# Patient Record
Sex: FEMALE | Race: WHITE
[De-identification: names, ages, dates, MRNs, and addresses within clinical notes are randomized per-mention and may not be internally consistent; named-entity substitution may affect disease eponyms.]

---

## 2018-10-17 ENCOUNTER — HOSPITAL ENCOUNTER (EMERGENCY)
Dept: HOSPITAL 76 - ED | Age: 58
LOS: 1 days | Discharge: HOME | End: 2018-10-18
Payer: COMMERCIAL

## 2018-10-17 DIAGNOSIS — R11.2: ICD-10-CM

## 2018-10-17 DIAGNOSIS — T62.0X1A: Primary | ICD-10-CM

## 2018-10-17 LAB
ALBUMIN DIAFP-MCNC: 4.1 G/DL (ref 3.2–5.5)
ALBUMIN/GLOB SERPL: 1.5 {RATIO} (ref 1–2.2)
ALP SERPL-CCNC: 71 IU/L (ref 42–121)
ALT SERPL W P-5'-P-CCNC: 20 IU/L (ref 10–60)
ANION GAP SERPL CALCULATED.4IONS-SCNC: 8 MMOL/L (ref 6–13)
AST SERPL W P-5'-P-CCNC: 22 IU/L (ref 10–42)
BILIRUB BLD-MCNC: 1.1 MG/DL (ref 0.2–1)
BUN SERPL-MCNC: 14 MG/DL (ref 6–20)
CALCIUM UR-MCNC: 9 MG/DL (ref 8.5–10.3)
CHLORIDE SERPL-SCNC: 102 MMOL/L (ref 101–111)
CO2 SERPL-SCNC: 27 MMOL/L (ref 21–32)
CREAT SERPLBLD-SCNC: 0.8 MG/DL (ref 0.4–1)
GFRSERPLBLD MDRD-ARVRAT: 74 ML/MIN/{1.73_M2} (ref 89–?)
GLOBULIN SER-MCNC: 2.8 G/DL (ref 2.1–4.2)
GLUCOSE SERPL-MCNC: 110 MG/DL (ref 70–100)
LIPASE SERPL-CCNC: 29 U/L (ref 22–51)
PROT SPEC-MCNC: 6.9 G/DL (ref 6.7–8.2)
SODIUM SERPLBLD-SCNC: 137 MMOL/L (ref 135–145)

## 2018-10-17 PROCEDURE — 36415 COLL VENOUS BLD VENIPUNCTURE: CPT

## 2018-10-17 PROCEDURE — 83690 ASSAY OF LIPASE: CPT

## 2018-10-17 PROCEDURE — 99283 EMERGENCY DEPT VISIT LOW MDM: CPT

## 2018-10-17 PROCEDURE — 80053 COMPREHEN METABOLIC PANEL: CPT

## 2018-10-17 NOTE — ED PHYSICIAN DOCUMENTATION
PD HPI NVD





- Stated complaint


Stated Complaint: N/V/D





- Chief complaint


Chief Complaint: Abd Pain





- History obtained from


History obtained from: Patient





- History of Present Illness


Timing - onset: How many hours ago (2 1/2), Today


Timing - duration: Hours (1-2)


Timing - details: Abrupt onset, Still present (much less, but still with some 

nausea.)


Associated symptoms: Abdominal pain (Mild cramping upper abdominal pain 

associated with the nausea and repetitive vomiting.).  No: Fever


Contributing factors: Bad food (She and her  both ate cassidy mushrooms 

that they had picked themselves in [place] and dehydrated.  They have had some 

of these in the past sauted.  The  had rehydrated them and CHOP them up 

and put them in a sauce without cooking.  They both felt abruptly nauseated with

repetitive vomiting about 20 minutes after eating the sauce.  They also had some

blue cheese dressing on the solid and some well cooked chicken.  Given both of 

them sick abruptly together, they presumed it was food related and were 

concerned about the mushrooms.).  No: Sick contact


Improved by: No: Vomiting


Worsened by: Eating


Similar symptoms before: Has not had sx before


Recently seen: Not recently seen





Review of Systems


Constitutional: denies: Fever, Myalgias


Nose: denies: Rhinorrhea / runny nose, Congestion


Throat: denies: Sore throat


Cardiac: denies: Chest pain / pressure


Respiratory: denies: Dyspnea, Cough


GI: reports: Abdominal Pain, Nausea, Vomiting.  denies: Diarrhea, Hematemesis, 

Bloody / black stool


Skin: denies: Rash, Lesions


Neurologic: denies: Altered mental status, Headache





PD PAST MEDICAL HISTORY





- Past Medical History


Past Medical History: Yes


Endocrine/Autoimmune: HyPOthyroidism





- Past Surgical History


Past Surgical History: Yes


Ortho: Other





- Present Medications


Home Medications: 


                                Ambulatory Orders











 Medication  Instructions  Recorded  Confirmed


 


Thyroid,Pork [[place] Thyroid] 1 tab PO DAILY 10/17/18 10/17/18


 


Thyroid,Pork [[place] Thyroid] 15 mg PO DAILY 10/17/18 10/17/18


 


Ondansetron Odt [Zofran] 4 mg TL Q6H PRN #15 tablet 10/18/18 














- Allergies


Allergies/Adverse Reactions: 


                                    Allergies











Allergy/AdvReac Type Severity Reaction Status Date / Time


 


doxycycline calcium * Allergy  Unknown Verified 10/17/18 21:40





[From Vibramycin]     


 


doxycycline hyclate * Allergy  Unknown Verified 10/17/18 21:40





[From Vibramycin]     


 


doxycycline monohydrate * Allergy  Unknown Verified 10/17/18 21:40





[From Vibramycin]     


 


Sulfa (Sulfonamide Allergy  Hives Verified 10/17/18 21:40





Antibiotics)     














- Social History


Does the pt smoke?: No


Smoking Status: Never smoker


Does the pt drink ETOH?: Yes


Does the pt have substance abuse?: No





- Immunizations


Immunizations are current?: Yes





- POLST


Patient has POLST: No





PD ED PE NORMAL





- Vitals


Vital signs reviewed: Yes





- General


General: Alert and oriented X 3, No acute distress, Well developed/nourished





- HEENT


HEENT: Pharynx benign





- Neck


Neck: Supple, no meningeal sign, No adenopathy





- Cardiac


Cardiac: RRR, No murmur





- Respiratory


Respiratory: Clear bilaterally





- Abdomen


Abdomen: Normal bowel sounds, Soft, Non distended, No organomegaly, Other (mild 

epigastric tender without guarding)





- Female 


Female : Deferred





- Rectal


Rectal: Deferred





- Back


Back: No CVA TTP





- Derm


Derm: Normal color, Warm and dry





- Extremities


Extremities: Normal ROM s pain





- Neuro


Neuro: Alert and oriented X 3, No motor deficit, Normal speech





Results





- Vitals


Vitals: 


                               Vital Signs - 24 hr











  10/17/18 10/18/18





  21:37 00:20


 


Temperature 36.3 C L 36.3 C L


 


Heart Rate 108 H 89


 


Respiratory 17 16





Rate  


 


Blood Pressure 147/103 H 120/81 H


 


O2 Saturation 98 98








                                     Oxygen











O2 Source                      Room air

















- Labs


Labs: 


                                Laboratory Tests











  10/17/18





  23:25


 


Sodium  137


 


Potassium  3.6


 


Chloride  102


 


Carbon Dioxide  27


 


Anion Gap  8.0


 


BUN  14


 


Creatinine  0.8


 


Estimated GFR (MDRD)  74 L


 


Glucose  110 H


 


Calcium  9.0


 


Total Bilirubin  1.1 H


 


AST  22


 


ALT  20


 


Alkaline Phosphatase  71


 


Total Protein  6.9


 


Albumin  4.1


 


Globulin  2.8


 


Albumin/Globulin Ratio  1.5


 


Lipase  29














PD MEDICAL DECISION MAKING





- ED course


Complexity details: reviewed results, considered differential (Presume mushroom 

toxicity or could be contamination on the mushrooms.  They had picked cassidy 

mushrooms which are fairly distinct.  They had not been cooked after rehydrating

so there could be some toxin which is less common femoral rales but are possible

with false de león.  There could also been some contamination such as bacteria 

or mold on the mushroom.  Alternatively it could have been the salad dressing or

something else in the meal.  However be more inclined to think over the 

mushrooms.  Her  is similarly sick and they are both feeling better after

a few hours of nausea and vomiting.  There are basic blood tests and liver 

enzymes are normal here.  Given the abruptness of the illness, and it is less 

likely to be 1 of the indolent liver toxic mushroom toxicities.), d/w patient





Departure





- Departure


Disposition: 01 Home, Self Care


Clinical Impression: 


Nausea and vomiting


Qualifiers:


 Vomiting type: unspecified Vomiting Intractability: non-intractable Qualified 

Code(s): R11.2 - Nausea with vomiting, unspecified





Mushroom poisoning


Qualifiers:


 Encounter type: initial encounter Injury intent: accidental or unintentional 

Qualified Code(s): T62.0X1A - Toxic effect of ingested mushrooms, accidental 

(unintentional), initial encounter





Condition: Stable


Record reviewed to determine appropriate education?: Yes


Instructions:  ED Food Poison Or Gastroenteritis


Follow-Up: 


Sixto Sanford MD [Primary Care Provider] - 


Prescriptions: 


Ondansetron Odt [Zofran] 4 mg TL Q6H PRN #15 tablet


 PRN Reason: Nausea / Vomiting


Comments: 


Your blood tests this evening are normal.  This may have been a intestinal 

irritation from the mushroom or could have potentially even been some 

contamination on the mushroom.  Subsequently Cook any mushrooms before eating.  

If your symptoms are resolved pretty much now, I would not expect him to return.

  An abrupt illness from the mushroom like this is usually not associated with 

the progressive liver toxicity.  If you have any continued nausea or stomach 

upset over the next couple of days, you could follow-up with your primary care 

to have them recheck the blood tests.  If you feel normal, I would not 

necessarily see a need for repeating the blood tests.


Discharge Date/Time: 10/18/18 00:22

## 2018-10-18 VITALS — SYSTOLIC BLOOD PRESSURE: 120 MMHG | DIASTOLIC BLOOD PRESSURE: 81 MMHG

## 2019-04-02 ENCOUNTER — HOSPITAL ENCOUNTER (OUTPATIENT)
Dept: HOSPITAL 76 - LAB.F | Age: 59
Discharge: HOME | End: 2019-04-02
Attending: SPECIALIST
Payer: COMMERCIAL

## 2019-04-02 DIAGNOSIS — E03.9: Primary | ICD-10-CM

## 2019-04-02 LAB
T4 FREE SERPL-MCNC: 0.71 NG/DL (ref 0.58–1.64)
TSH SERPL-ACNC: 1.69 UIU/ML (ref 0.34–5.6)

## 2019-04-02 PROCEDURE — 36415 COLL VENOUS BLD VENIPUNCTURE: CPT

## 2019-04-02 PROCEDURE — 84443 ASSAY THYROID STIM HORMONE: CPT

## 2019-04-02 PROCEDURE — 84439 ASSAY OF FREE THYROXINE: CPT
